# Patient Record
Sex: FEMALE | URBAN - METROPOLITAN AREA
[De-identification: names, ages, dates, MRNs, and addresses within clinical notes are randomized per-mention and may not be internally consistent; named-entity substitution may affect disease eponyms.]

---

## 2017-07-22 ENCOUNTER — OFFICE VISIT (OUTPATIENT)
Dept: FAMILY MEDICINE | Facility: CLINIC | Age: 42
End: 2017-07-22

## 2017-07-22 VITALS
TEMPERATURE: 98.5 F | WEIGHT: 158 LBS | HEART RATE: 83 BPM | SYSTOLIC BLOOD PRESSURE: 117 MMHG | DIASTOLIC BLOOD PRESSURE: 75 MMHG | OXYGEN SATURATION: 95 %

## 2017-07-22 DIAGNOSIS — J18.9 PNEUMONIA DUE TO INFECTIOUS ORGANISM, UNSPECIFIED LATERALITY, UNSPECIFIED PART OF LUNG: Primary | ICD-10-CM

## 2017-07-22 DIAGNOSIS — J45.901 ASTHMA EXACERBATION: ICD-10-CM

## 2017-07-22 RX ORDER — AZITHROMYCIN 250 MG/1
TABLET, FILM COATED ORAL
Qty: 6 TABLET | Refills: 0 | Status: SHIPPED | OUTPATIENT
Start: 2017-07-22

## 2017-07-22 RX ORDER — PREDNISONE 10 MG/1
TABLET ORAL
Qty: 17 TABLET | Refills: 0 | Status: SHIPPED | OUTPATIENT
Start: 2017-07-22

## 2017-07-22 RX ORDER — ALBUTEROL SULFATE 90 UG/1
2 AEROSOL, METERED RESPIRATORY (INHALATION) PRN
COMMUNITY

## 2017-07-22 ASSESSMENT — PAIN SCALES - GENERAL: PAINLEVEL: NO PAIN (0)

## 2017-07-22 NOTE — MR AVS SNAPSHOT
After Visit Summary   2017    Richelle Lama    MRN: [id number]           Patient Information     Date Of Birth          1975        Visit Information        Provider Department      2017 10:40 AM Darren Roldan MD [facility]        Today's Diagnoses     Pneumonia due to infectious organism, unspecified laterality, unspecified part of lung    -  1    Asthma exacerbation          Care Instructions    PLAN:  - Continue with your albuterol every 4 hours as needed.  -Take a Claritin or Zyrtec today in case seasonal allergies are a precipitant.   Start Prednisone today at 40 mg. Take 40 today and tomorrow, then 30 for 3 days, then stop.   -If no improvement, by tomorrow, then start Azithromax , Z-pack   -If worse, go to emergency room          Follow-ups after your visit        Who to contact     Please call your clinic at [phone] to:    Ask questions about your health    Make or cancel appointments    Discuss your medicines    Learn about your test results    Speak to your doctor   If you have compliments or concerns about an experience at your clinic, or if you wish to file a complaint, please contact [facility] Patient Relations at [phone] or email us at [email or web address]         Additional Information About Your Visit        MyChart Information     [employer] is an electronic gateway that provides easy, online access to your medical records. With [employer], you can request a clinic appointment, read your test results, renew a prescription or communicate with your care team.     To sign up for [employer] visit the website at [email or web address]   You will be asked to enter the access code listed below, as well as some personal information. Please follow the directions to create your username and password.     Your access code is: [email or web address]  Expires: 10/20/2017 11:25 AM     Your access code will  in 90 days. If you  need help or a new code, please contact your [facility] or call [phone] for assistance.        Care EveryWhere ID     This is your Care EveryWhere ID. This could be used by other organizations to access your [facility] medical records  [id number]        Your Vitals Were     Pulse Temperature Pulse Oximetry             83 98.5  F (36.9  C) (Oral) 95%          Blood Pressure from Last 3 Encounters:   07/22/17 117/75    Weight from Last 3 Encounters:   07/22/17 158 lb (71.7 kg)              We Performed the Following     ALBUTEROL UNIT DOSE, 1 MG -      INHALATION/NEBULIZER TREATMENT, INITIAL     PEAK EXPIR FLOW METER,HAND HELD          Today's Medication Changes          These changes are accurate as of: 7/22/17 11:25 AM.  If you have any questions, ask your nurse or doctor.               Start taking these medicines.        Dose/Directions    azithromycin 250 MG tablet   Commonly known as:  ZITHROMAX Z-SATYA   Used for:  Pneumonia due to infectious organism, unspecified laterality, unspecified part of lung   Started by:  Darren Roldan MD        Take 2 on first day and then 1 daily   Quantity:  6 tablet   Refills:  0       predniSONE 10 MG tablet   Commonly known as:  DELTASONE   Used for:  Asthma exacerbation   Started by:  Darren Roldan MD        Take 4 today and 4 tomorrow. Then 3 daily for the next 3 days.   Quantity:  17 tablet   Refills:  0            Where to get your medicines      These medications were sent to [facility] [id number] [place] [address] [employer] [address] [address] [employer] [facility] [address]     Phone:  [phone]     predniSONE 10 MG tablet         Some of these will need a paper prescription and others can be bought over the counter.  Ask your nurse if you have questions.     Bring a paper prescription for each of these medications     azithromycin 250 MG tablet                Primary  Care Provider    None       No address on file        Equal Access to Services     [facility] PRIYANKA : Hadii aad ku hadhoney Fleming, walidada luqjd, qakun darielantoinetteagus powellcharisjulee do. So [facility] [phone].    ATENCIÓN: Si habla español, tiene a swanson disposición servicios gratuitos de asistencia lingüística. [facility] [phone].    We comply with applicable federal civil rights laws and [place] laws. We do not discriminate on the basis of race, color, national origin, age, disability sex, sexual orientation or gender identity.            Thank you!     Thank you for choosing [facility]  for your care. Our goal is always to provide you with excellent care. Hearing back from our patients is one way we can continue to improve our services. Please take a few minutes to complete the written survey that you may receive in the mail after your visit with us. Thank you!             Your Updated Medication List - Protect others around you: Learn how to safely use, store and throw away your medicines at www.disposemymeds.org.          This list is accurate as of: 7/22/17 11:25 AM.  Always use your most recent med list.                   Brand Name Dispense Instructions for use Diagnosis    albuterol 108 (90 BASE) MCG/ACT Inhaler    PROAIR HFA/PROVENTIL HFA/VENTOLIN HFA     Inhale 2 puffs into the lungs as needed for shortness of breath / dyspnea or wheezing        azithromycin 250 MG tablet    ZITHROMAX Z-SATYA    6 tablet    Take 2 on first day and then 1 daily    Pneumonia due to infectious organism, unspecified laterality, unspecified part of lung       predniSONE 10 MG tablet    DELTASONE    17 tablet    Take 4 today and 4 tomorrow. Then 3 daily for the next 3 days.    Asthma exacerbation

## 2017-07-22 NOTE — PATIENT INSTRUCTIONS
PLAN:  - Continue with your albuterol every 4 hours as needed.  -Take a Claritin or Zyrtec today in case seasonal allergies are a precipitant.   Start Prednisone today at 40 mg. Take 40 today and tomorrow, then 30 for 3 days, then stop.   -If no improvement, by tomorrow, then start Azithromax , Z-pack   -If worse, go to emergency room

## 2017-07-22 NOTE — PROGRESS NOTES
Richelle Lama is a 42 year old female who presents for her first visit to the [facility] with the following concern:  Asthma exacerbation.     Richelle lives in [place]. She arrived into [place] 2 days ago on July 20 as her  is here for a conference. [place].   About 4 days ago began with a URI, cough. Symptoms worsened yesterday with wheezing/chest tightness.    Has had asthma all of her life. Grew up in [place]. Has been to the hospital about 10 times in her life with asthma. Formerly was using Advair, but has not required it for the past 2 years as she has been doing well. Does not have any with her on this trip.    Review Of Systems:  No fevers, sweats or chills.   Has otherwise been in usual state of health, e.g.   Cardiovascular: negative  Gastrointestinal: negative  Genitourinary: negative    LMP - Now      PMH- Asthma    Current Outpatient Prescriptions   Medication Sig Dispense Refill     albuterol (PROAIR HFA/PROVENTIL HFA/VENTOLIN HFA) 108 (90 BASE) MCG/ACT Inhaler Inhale 2 puffs into the lungs as needed for shortness of breath / dyspnea or wheezing         Allergies   Allergen Reactions     Apples [Apple] Anaphylaxis     Nuts Anaphylaxis     Carrot [Daucus Carota]      Soy [Isoflavones]         Social:   Originally from [place]. Lives in [place]      EXAM    Vitals: /75 (BP Location: Right arm, Patient Position: Sitting, Cuff Size: Adult Small)  Pulse 83  Temp 98.5  F (36.9  C) (Oral)  Wt 158 lb (71.7 kg)  SpO2 95%  BMI= There is no height or weight on file to calculate BMI.  Appears wheezy on presentation. Able to talk in near full sentences without shortness of breath.   Peak flow was 225 on presentation  Lungs were with insp and exp wheezes and poor air exchange to peripheries.   CV exam - normal     After a nebulizer treatment, Richelle felt moderately better.   Peak Flow improved to 310. Still, well below average of around 490 given her  height      ASSESSMENT:  -Asthma exacerbation    PLAN:  - Continue with your albuterol every 4 hours as needed.  -Take a Claritin or Zyrtec today in case seasonal allergies are a precipitant.   Start Prednisone today at 40 mg. Take 40 today and tomorrow, then 30 for 3 days, then stop.   -If no improvement, by tomorrow, then start Azithromax , Z-pack   -If worse, go to emergency room    --Darren Roldan MD  [facility], Department of Family Medicine and [facility]

## 2017-07-22 NOTE — NURSING NOTE
Chief Complaint   Patient presents with     URI     Asthma     42 year old      Blood pressure 117/75, pulse 83, temperature 98.5  F (36.9  C), temperature source Oral, weight 158 lb (71.7 kg), SpO2 95 %. There is no height or weight on file to calculate BMI.    Wt Readings from Last 2 Encounters:   07/22/17 158 lb (71.7 kg)     BP Readings from Last 3 Encounters:   07/22/17 117/75       There is no problem list on file for this patient.      Current Outpatient Prescriptions   Medication Sig Dispense Refill     albuterol (PROAIR HFA/PROVENTIL HFA/VENTOLIN HFA) 108 (90 BASE) MCG/ACT Inhaler Inhale 2 puffs into the lungs as needed for shortness of breath / dyspnea or wheezing         Social History   Substance Use Topics     Smoking status: Never Smoker     Smokeless tobacco: Not on file     Alcohol use Not on file         Health Maintenance Due   Topic Date Due     TETANUS IMMUNIZATION (SYSTEM ASSIGNED)  03/22/1994     PAP SCREENING Q3 YR (SYSTEM ASSIGNED)  03/22/1997       АННА HOUGH CMA  July 22, 2017 10:49 AM

## 2017-07-24 ASSESSMENT — ANXIETY QUESTIONNAIRES
6. BECOMING EASILY ANNOYED OR IRRITABLE: NOT AT ALL
3. WORRYING TOO MUCH ABOUT DIFFERENT THINGS: NOT AT ALL
1. FEELING NERVOUS, ANXIOUS, OR ON EDGE: NOT AT ALL
GAD7 TOTAL SCORE: 0
2. NOT BEING ABLE TO STOP OR CONTROL WORRYING: NOT AT ALL
5. BEING SO RESTLESS THAT IT IS HARD TO SIT STILL: NOT AT ALL
IF YOU CHECKED OFF ANY PROBLEMS ON THIS QUESTIONNAIRE, HOW DIFFICULT HAVE THESE PROBLEMS MADE IT FOR YOU TO DO YOUR WORK, TAKE CARE OF THINGS AT HOME, OR GET ALONG WITH OTHER PEOPLE: NOT DIFFICULT AT ALL
7. FEELING AFRAID AS IF SOMETHING AWFUL MIGHT HAPPEN: NOT AT ALL

## 2017-07-24 ASSESSMENT — PATIENT HEALTH QUESTIONNAIRE - PHQ9: 5. POOR APPETITE OR OVEREATING: NOT AT ALL

## 2017-07-25 ASSESSMENT — ANXIETY QUESTIONNAIRES: GAD7 TOTAL SCORE: 0
